# Patient Record
Sex: MALE | Race: WHITE | NOT HISPANIC OR LATINO | Employment: FULL TIME | URBAN - METROPOLITAN AREA
[De-identification: names, ages, dates, MRNs, and addresses within clinical notes are randomized per-mention and may not be internally consistent; named-entity substitution may affect disease eponyms.]

---

## 2024-10-25 ENCOUNTER — APPOINTMENT (OUTPATIENT)
Dept: RADIOLOGY | Facility: HOSPITAL | Age: 54
End: 2024-10-25
Payer: COMMERCIAL

## 2024-10-25 ENCOUNTER — HOSPITAL ENCOUNTER (EMERGENCY)
Facility: HOSPITAL | Age: 54
Discharge: HOME | End: 2024-10-25
Attending: STUDENT IN AN ORGANIZED HEALTH CARE EDUCATION/TRAINING PROGRAM
Payer: COMMERCIAL

## 2024-10-25 VITALS
BODY MASS INDEX: 31.15 KG/M2 | WEIGHT: 230 LBS | SYSTOLIC BLOOD PRESSURE: 128 MMHG | HEIGHT: 72 IN | RESPIRATION RATE: 16 BRPM | HEART RATE: 87 BPM | TEMPERATURE: 98.2 F | OXYGEN SATURATION: 98 % | DIASTOLIC BLOOD PRESSURE: 78 MMHG

## 2024-10-25 DIAGNOSIS — F10.920 ALCOHOLIC INTOXICATION WITHOUT COMPLICATION (CMS-HCC): ICD-10-CM

## 2024-10-25 DIAGNOSIS — S99.911A INJURY OF RIGHT ANKLE, INITIAL ENCOUNTER: ICD-10-CM

## 2024-10-25 DIAGNOSIS — S01.01XA LACERATION OF SCALP, INITIAL ENCOUNTER: ICD-10-CM

## 2024-10-25 DIAGNOSIS — S02.92XA: ICD-10-CM
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
## 2024-10-25 DIAGNOSIS — S09.90XA HEAD INJURY, INITIAL ENCOUNTER: Primary | ICD-10-CM

## 2024-10-25 LAB
ALBUMIN SERPL BCP-MCNC: 4.5 G/DL (ref 3.4–5)
ALP SERPL-CCNC: 78 U/L (ref 33–120)
ALT SERPL W P-5'-P-CCNC: 31 U/L (ref 10–52)
ANION GAP SERPL CALCULATED.3IONS-SCNC: 14 MMOL/L (ref 10–20)
AST SERPL W P-5'-P-CCNC: 50 U/L (ref 9–39)
BASOPHILS # BLD AUTO: 0.04 X10*3/UL (ref 0–0.1)
BASOPHILS NFR BLD AUTO: 0.3 %
BILIRUB SERPL-MCNC: 0.6 MG/DL (ref 0–1.2)
BUN SERPL-MCNC: 11 MG/DL (ref 6–23)
CALCIUM SERPL-MCNC: 9.1 MG/DL (ref 8.6–10.3)
CHLORIDE SERPL-SCNC: 106 MMOL/L (ref 98–107)
CO2 SERPL-SCNC: 25 MMOL/L (ref 21–32)
CREAT SERPL-MCNC: 0.87 MG/DL (ref 0.5–1.3)
EGFRCR SERPLBLD CKD-EPI 2021: >90 ML/MIN/1.73M*2
EOSINOPHIL # BLD AUTO: 0.08 X10*3/UL (ref 0–0.7)
EOSINOPHIL NFR BLD AUTO: 0.6 %
ERYTHROCYTE [DISTWIDTH] IN BLOOD BY AUTOMATED COUNT: 12.9 % (ref 11.5–14.5)
ETHANOL SERPL-MCNC: 141 MG/DL
GLUCOSE SERPL-MCNC: 111 MG/DL (ref 74–99)
HCT VFR BLD AUTO: 45.5 % (ref 41–52)
HGB BLD-MCNC: 15 G/DL (ref 13.5–17.5)
IMM GRANULOCYTES # BLD AUTO: 0.06 X10*3/UL (ref 0–0.7)
IMM GRANULOCYTES NFR BLD AUTO: 0.4 % (ref 0–0.9)
LYMPHOCYTES # BLD AUTO: 1.35 X10*3/UL (ref 1.2–4.8)
LYMPHOCYTES NFR BLD AUTO: 9.4 %
MCH RBC QN AUTO: 31.6 PG (ref 26–34)
MCHC RBC AUTO-ENTMCNC: 33 G/DL (ref 32–36)
MCV RBC AUTO: 96 FL (ref 80–100)
MONOCYTES # BLD AUTO: 0.86 X10*3/UL (ref 0.1–1)
MONOCYTES NFR BLD AUTO: 6 %
NEUTROPHILS # BLD AUTO: 12.01 X10*3/UL (ref 1.2–7.7)
NEUTROPHILS NFR BLD AUTO: 83.3 %
NRBC BLD-RTO: 0 /100 WBCS (ref 0–0)
PLATELET # BLD AUTO: 253 X10*3/UL (ref 150–450)
POTASSIUM SERPL-SCNC: 4.2 MMOL/L (ref 3.5–5.3)
PROT SERPL-MCNC: 7 G/DL (ref 6.4–8.2)
RBC # BLD AUTO: 4.75 X10*6/UL (ref 4.5–5.9)
SODIUM SERPL-SCNC: 141 MMOL/L (ref 136–145)
WBC # BLD AUTO: 14.4 X10*3/UL (ref 4.4–11.3)

## 2024-10-25 PROCEDURE — 99285 EMERGENCY DEPT VISIT HI MDM: CPT | Mod: 25

## 2024-10-25 PROCEDURE — 70450 CT HEAD/BRAIN W/O DYE: CPT

## 2024-10-25 PROCEDURE — 96374 THER/PROPH/DIAG INJ IV PUSH: CPT | Mod: 59

## 2024-10-25 PROCEDURE — 12001 RPR S/N/AX/GEN/TRNK 2.5CM/<: CPT | Performed by: STUDENT IN AN ORGANIZED HEALTH CARE EDUCATION/TRAINING PROGRAM

## 2024-10-25 PROCEDURE — 80053 COMPREHEN METABOLIC PANEL: CPT | Performed by: STUDENT IN AN ORGANIZED HEALTH CARE EDUCATION/TRAINING PROGRAM

## 2024-10-25 PROCEDURE — 71045 X-RAY EXAM CHEST 1 VIEW: CPT

## 2024-10-25 PROCEDURE — 72170 X-RAY EXAM OF PELVIS: CPT

## 2024-10-25 PROCEDURE — 72125 CT NECK SPINE W/O DYE: CPT

## 2024-10-25 PROCEDURE — 76377 3D RENDER W/INTRP POSTPROCES: CPT

## 2024-10-25 PROCEDURE — 2500000004 HC RX 250 GENERAL PHARMACY W/ HCPCS (ALT 636 FOR OP/ED): Performed by: STUDENT IN AN ORGANIZED HEALTH CARE EDUCATION/TRAINING PROGRAM

## 2024-10-25 PROCEDURE — 70486 CT MAXILLOFACIAL W/O DYE: CPT

## 2024-10-25 PROCEDURE — 85025 COMPLETE CBC W/AUTO DIFF WBC: CPT | Performed by: STUDENT IN AN ORGANIZED HEALTH CARE EDUCATION/TRAINING PROGRAM

## 2024-10-25 PROCEDURE — 36415 COLL VENOUS BLD VENIPUNCTURE: CPT | Performed by: STUDENT IN AN ORGANIZED HEALTH CARE EDUCATION/TRAINING PROGRAM

## 2024-10-25 PROCEDURE — 12002 RPR S/N/AX/GEN/TRNK2.6-7.5CM: CPT

## 2024-10-25 PROCEDURE — 73630 X-RAY EXAM OF FOOT: CPT | Mod: RIGHT SIDE

## 2024-10-25 PROCEDURE — 73630 X-RAY EXAM OF FOOT: CPT | Mod: RT

## 2024-10-25 PROCEDURE — 73610 X-RAY EXAM OF ANKLE: CPT | Mod: RIGHT SIDE

## 2024-10-25 PROCEDURE — 73610 X-RAY EXAM OF ANKLE: CPT | Mod: RT

## 2024-10-25 PROCEDURE — 82077 ASSAY SPEC XCP UR&BREATH IA: CPT | Performed by: STUDENT IN AN ORGANIZED HEALTH CARE EDUCATION/TRAINING PROGRAM

## 2024-10-25 RX ORDER — KETOROLAC TROMETHAMINE 10 MG/1
10 TABLET, FILM COATED ORAL EVERY 6 HOURS PRN
Qty: 20 TABLET | Refills: 0 | Status: SHIPPED | OUTPATIENT
Start: 2024-10-25 | End: 2024-10-30

## 2024-10-25 RX ORDER — LIDOCAINE HYDROCHLORIDE 10 MG/ML
5 INJECTION, SOLUTION INFILTRATION; PERINEURAL ONCE
Status: COMPLETED | OUTPATIENT
Start: 2024-10-25 | End: 2024-10-25

## 2024-10-25 RX ORDER — FENTANYL CITRATE 50 UG/ML
50 INJECTION, SOLUTION INTRAMUSCULAR; INTRAVENOUS ONCE
Status: COMPLETED | OUTPATIENT
Start: 2024-10-25 | End: 2024-10-25

## 2024-10-25 ASSESSMENT — PAIN SCALES - GENERAL
PAINLEVEL_OUTOF10: 10 - WORST POSSIBLE PAIN
PAINLEVEL_OUTOF10: 8

## 2024-10-25 ASSESSMENT — COLUMBIA-SUICIDE SEVERITY RATING SCALE - C-SSRS
1. IN THE PAST MONTH, HAVE YOU WISHED YOU WERE DEAD OR WISHED YOU COULD GO TO SLEEP AND NOT WAKE UP?: NO
2. HAVE YOU ACTUALLY HAD ANY THOUGHTS OF KILLING YOURSELF?: NO
6. HAVE YOU EVER DONE ANYTHING, STARTED TO DO ANYTHING, OR PREPARED TO DO ANYTHING TO END YOUR LIFE?: NO

## 2024-10-25 ASSESSMENT — PAIN - FUNCTIONAL ASSESSMENT: PAIN_FUNCTIONAL_ASSESSMENT: 0-10

## 2024-10-25 NOTE — DISCHARGE INSTRUCTIONS
Follow-up with facial surgery Dr. Sanders to schedule an appointment so that your injuries can be reevaluated within the next 5 days based on his recommendations.  Follow sinus precautions including: Cough with your mouth open, sneeze with your mouth open, do not blow your nose as these can increase pressure in the face around the fractures and cause further damage.  Return to the hospital for reevaluation if you have worsening eye pain, if you develop vision loss or blurred vision, fevers, or for any new or worsening symptoms such as abdominal pain, nausea and vomiting, blood in the urine or stool, chest pain, worsening shortness of breath all of which would require reassessment by a physician.    You should follow-up with Dr. Paez for an ankle surgeon for further evaluation of your ankle injury, use crutches and keep weight off of the affected foot and ankle, take Tylenol and Toradol as needed for pain control.  No fracture was found on x-ray imaging today, if you have worsening symptoms or persistent pain and inability to walk on the foot you may need repeat imaging.    Follow wound care instructions as provided. Keep the area clean and dry as much as possible. It is okay to submerge under running water, but do not submurge in standing water such as baths, pools, lakes or ponds etc. You can use antibiotic ointment to keep the wound moist and prevent infection.     Follow up in 7-10 days for suture removal. This can be done by your primary care physician, an urgent care, any emergency department.     Return to the ED for any new or concerning symptoms including reinjury, if your sutures come out too early and the wound reopens, if you develop green or white drainage, worsening pain, red rash around the site concerning for infection.

## 2024-10-27 NOTE — ED PROVIDER NOTES
HPI   Chief Complaint   Patient presents with    Fall     Patient bib family for fall. Patient is a heavy drinker, doesn't know how may steps he fell down. Co head, lip , flank, and leg pain. Vss, no loc        This is a 54-year-old male presenting the ED for evaluation of injuries after a fall down the steps which occurred last night.  He states that he drinks alcohol daily, he is not sure how much he drinks daily.  He states he was drunk last night and fell down the steps at home, he is not sure how many steps he fell down.  He is brought to the ED by family today for evaluation.  He complains mostly of pain at the right ankle, and has bruising and mild swelling to the area.  He also sustained lacerations to the top of the head and has bruising around the left eye.  He states that he feels like he is still drunk, he is not sure if he lost consciousness when he fell.  He denies any pain in the extremities, the back, the chest or abdomen.      History provided by:  Patient   used: No            Patient History   History reviewed. No pertinent past medical history.  History reviewed. No pertinent surgical history.  No family history on file.  Social History     Tobacco Use    Smoking status: Not on file    Smokeless tobacco: Not on file   Substance Use Topics    Alcohol use: Not on file    Drug use: Not on file       Physical Exam   ED Triage Vitals [10/25/24 0804]   Temperature Heart Rate Respirations BP   37 °C (98.6 °F) (!) 106 20 131/90      Pulse Ox Temp Source Heart Rate Source Patient Position   94 % Temporal Monitor Sitting      BP Location FiO2 (%)     Left arm --       Physical Exam  General: well developed, well nourished adult male who is awake and alert, in no apparent distress.  He is clinically intoxicated on arrival, bruising around the left eye and lacerations noted to the top of the head immediately.  Eyes: sclera clear bilaterally, PERRL, EOMI  HENT: normocephalic, lacerations  to the top of the head as well as mild skin tears adjacent to the area.  He has bruising circumferentially about the left orbit, no tenderness to palpation over the area, no facial instability. Pharynx without erythema or exudates, uvula midline.  No apparent dental injury, he does have a superficial abrasion over the right lower lip.  No intraoral laceration noted.  CV: regular rate and rhythm, no murmur, no gallops, or rubs. radial and dorsalis pedis pulses +2/4 bilaterally  Resp: clear to ascultation bilaterally, no wheezes, rales, or rhonchi  GI: abdomen soft, nontender without rigidity or guarding, no peritoneal signs, abdomen is nondistended, no masses palpated  MSK: No midline spinal tenderness, no tenderness to palpation over the ribs or chest wall, no tenderness to palpation over the shoulders, the upper extremities, the hips or pelvis, the lower extremities other than the right ankle.  There is mild to moderate swelling about the right ankle as well as some bruising posteriorly at the left heel.   Neuro: no focal deficits, CN2-12 intact. Sensation fully intact.  Psych: appropriate mood and affect, cooperative with exam  Skin: warm, dry, multiple abrasions to the top of the head as well as to approximately 2 cm lacerations.    ED Course & MDM   Diagnoses as of 10/27/24 1201   Head injury, initial encounter   Fall, initial encounter   Injury of right ankle, initial encounter   Multiple facial fractures, closed, initial encounter (Multi)   Laceration of scalp, initial encounter   Alcoholic intoxication without complication ([facility])                 No data recorded                                 Medical Decision Making  The patient was evaluated immediately upon arrival to the room in the ED.  Full head to toe exam was performed, he is mildly tachycardic on arrival, vitals otherwise normal other than mild hypertension.  He is awake alert, oriented x 4 although he does appear to be intoxicated still.  He has  to 2 cm lacerations to the top of the head as well as some abrasions and small skin tears adjacent to the area.  There is bruising around the left orbit.  No drainage from the eye or bleeding, no hyphema, patient reports normal visual acuity compared with his baseline and denies any eye pain.  He denies nausea and vomiting.  Visual fields are intact and extraocular movements are also intact.  Otherwise there is no tenderness to palpation over the cervical thoracic or lumbar spine in the midline, no tenderness to palpation over the shoulders, the hips or pelvis, the lower extremities other than the right ankle.  There is some bruising about the right heel bilaterally and mild swelling to the right ankle.  He has no abdominal tenderness, no tenderness over the ribs of the chest wall, no bruising over the chest or abdomen.    Patient was given 50 mcg of fentanyl for pain control.  Lidocaine was used for local anesthesia and 8 sutures were placed in total for closure of his lacerations.  There are some small skin tears to the top of the head that are not amenable to closure with sutures.    The patient was sent for CT imaging of his head and cervical spine and facial bones, x-rays of the chest and pelvis due to the mechanism of injury as well as x-rays of the foot and ankle.    X-rays of the chest and pelvis showed no acute fracture or bony deformity, no evidence of pneumothorax or other traumatic injury in the chest or pelvis.  X-ray of the right foot and ankle also were unremarkable showing no evidence of fracture or dislocation.  No deformity of the foot or ankle.  Given these findings I suspect soft tissue or ligamentous injury.  Patient was placed in an ankle immobilizer and given crutches, instructed on nonweightbearing status and referred to foot and ankle surgery for further assessment as an outpatient.  We discussed that CT could be obtained for further assessment given his ankle pain and bruising to further  evaluate for occult fracture or he can follow-up with the foot ankle surgeon after negative x-rays with nonweightbearing status.  He prefers to follow-up.    Patient's cervical spine shows no evidence of fracture or dislocation.  CT of the head and facial bones show no intracranial hemorrhage, however he does have multiple facial bone fractures including multiple orbital fractures, maxillary fractures, and an extraconal intraorbital hematoma.    These findings were discussed with the oral maxillofacial surgeon on-call for facial trauma, Dr. Sanders.  He does not feel that the patient needs to be transferred for further trauma evaluation or admitted to the hospital for emergent management of these fractures.  He states that patient should be placed on sinus precautions and he can follow-up in the office within the next 5 days for reassessment.  The patient does feel comfortable with this plan given that he has minimal pain at the site of the fractures, no facial instability on exam, and no change in his vision or eye pain.    On reassessment after imaging results and discussion with the oral maxillofacial surgeon, the patient is clinically sober.  He demonstrates understanding and decision making capacity on discussion of his results.  He does feel comfortable using crutches.  He was advised on the importance of follow-up with the facial surgeon as well as sinus precautions.  Return precautions were discussed including vision loss or double vision, eye pain, any new pain or signs of other injury that was not completely evaluated today which would require reassessment by physician immediately.  He was discharged in stable condition.    CT head wo IV contrast   Final Result   No evidence of acute cortical infarct or intracranial hemorrhage.        Facial fractures including fracture of the left maxilla with the   adjacent soft tissue swelling and soft tissue air. Please see   separate CT facial bone report for  details.        MACRO:   None.        Signed by: Shantelle Steele 10/25/2024 9:40 AM   Dictation workstation:   [id number]      CT cervical spine wo IV contrast   Final Result   No evidence for an acute fracture. Minimal degenerative   retrolisthesis C3-4. Multiple levels of degenerative stenosis, most   severe at C4-5.        MACRO:   None        Signed by: Shantelle Steele 10/25/2024 9:53 AM   Dictation workstation:   [id number]      CT maxillofacial bones wo IV contrast   Final Result   Lateral orbital, inferior orbital, anterior, medial and lateral   maxillary fractures and widening of the frontozygomatic suture noted.   However, there is no fracture noted in the zygomatic arch.        Lateral extraconal intraorbital hematoma. No intraconal hematoma is   noted and there is no obvious injury to the globe.        Left periorbital and left cheek soft tissue swelling and soft tissue   emphysema.        MACRO:   None        Signed by: Shantelle Steele 10/25/2024 9:49 AM   Dictation workstation:   [id number]      CT 3D reconstruction   Final Result   Lateral orbital, inferior orbital, anterior, medial and lateral   maxillary fractures and widening of the frontozygomatic suture noted.   However, there is no fracture noted in the zygomatic arch.        Lateral extraconal intraorbital hematoma. No intraconal hematoma is   noted and there is no obvious injury to the globe.        Left periorbital and left cheek soft tissue swelling and soft tissue   emphysema.        MACRO:   None        Signed by: Shantelle Steele 10/25/2024 9:49 AM   Dictation workstation:   [id number]      XR chest 1 view   Final Result   No acute radiographic abnormality        MACRO:   None.        Signed by: Shantelle Steele 10/25/2024 9:31 AM   Dictation workstation:   [id number]      XR pelvis 1-2 views   Final Result   No acute osseous abnormality             MACRO:   None        Signed by: hSantelle Steele 10/25/2024 9:38 AM   Dictation  workstation:   [id number]      XR foot right 3+ views   Final Result   No acute osseous abnormality             MACRO:   None        Signed by: Shantelle Steele 10/25/2024 9:33 AM   Dictation workstation:   [id number]      XR ankle right 3+ views   Final Result   No acute osseous abnormality             MACRO:   None        Signed by: Shantelle Steele 10/25/2024 9:33 AM   Dictation workstation:   [id number]        Labs Reviewed   CBC WITH AUTO DIFFERENTIAL - Abnormal       Result Value    WBC 14.4 (*)     nRBC 0.0      RBC 4.75      Hemoglobin 15.0      Hematocrit 45.5      MCV 96      MCH 31.6      MCHC 33.0      RDW 12.9      Platelets 253      Neutrophils % 83.3      Immature Granulocytes %, Automated 0.4      Lymphocytes % 9.4      Monocytes % 6.0      Eosinophils % 0.6      Basophils % 0.3      Neutrophils Absolute 12.01 (*)     Immature Granulocytes Absolute, Automated 0.06      Lymphocytes Absolute 1.35      Monocytes Absolute 0.86      Eosinophils Absolute 0.08      Basophils Absolute 0.04     COMPREHENSIVE METABOLIC PANEL - Abnormal    Glucose 111 (*)     Sodium 141      Potassium 4.2      Chloride 106      Bicarbonate 25      Anion Gap 14      Urea Nitrogen 11      Creatinine 0.87      eGFR >90      Calcium 9.1      Albumin 4.5      Alkaline Phosphatase 78      Total Protein 7.0      AST 50 (*)     Bilirubin, Total 0.6      ALT 31     ALCOHOL - Abnormal    Alcohol 141 (*)          Procedure  Laceration Repair    Performed by: Nikko Mckeon DO  Authorized by: Nikko Mckeon DO    Consent:     Consent obtained:  Verbal    Consent given by:  Patient    Risks, benefits, and alternatives were discussed: yes      Risks discussed:  Pain, poor cosmetic result, infection and poor wound healing    Alternatives discussed:  No treatment  Universal protocol:     Procedure explained and questions answered to patient or proxy's satisfaction: yes      Relevant documents present and verified: yes      Test  results available: yes      Imaging studies available: yes      Required blood products, implants, devices, and special equipment available: yes      Site/side marked: yes      Immediately prior to procedure, a time out was called: yes      Patient identity confirmed:  Verbally with patient  Anesthesia:     Anesthesia method:  Local infiltration    Local anesthetic:  Lidocaine 1% w/o epi  Laceration details:     Location:  Scalp    Scalp location:  Frontal    Length (cm):  2  Pre-procedure details:     Preparation:  Patient was prepped and draped in usual sterile fashion  Exploration:     Wound exploration: wound explored through full range of motion      Wound extent: no foreign bodies/material noted, no muscle damage noted and no underlying fracture noted      Contaminated: no    Treatment:     Area cleansed with:  Povidone-iodine    Amount of cleaning:  Standard    Irrigation solution:  Sterile saline    Debridement:  Minimal    Undermining:  None  Skin repair:     Repair method:  Sutures    Suture size:  4-0    Suture material:  Nylon    Suture technique:  Simple interrupted    Number of sutures:  4  Approximation:     Approximation:  Close  Repair type:     Repair type:  Complex  Post-procedure details:     Dressing:  Open (no dressing)    Procedure completion:  Tolerated well, no immediate complications  Laceration Repair    Performed by: Nikko Mckeon DO  Authorized by: Nikko Mckeon DO    Consent:     Consent obtained:  Verbal    Consent given by:  Patient    Risks, benefits, and alternatives were discussed: yes      Risks discussed:  Infection, pain, poor cosmetic result and poor wound healing    Alternatives discussed:  No treatment  Universal protocol:     Procedure explained and questions answered to patient or proxy's satisfaction: yes      Relevant documents present and verified: yes      Test results available: yes      Imaging studies available: yes      Required blood products,  implants, devices, and special equipment available: yes      Site/side marked: yes      Immediately prior to procedure, a time out was called: yes      Patient identity confirmed:  Verbally with patient  Anesthesia:     Anesthesia method:  None  Laceration details:     Location:  Scalp    Scalp location:  Crown    Length (cm):  2  Pre-procedure details:     Preparation:  Patient was prepped and draped in usual sterile fashion  Exploration:     Limited defect created (wound extended): no      Wound exploration: wound explored through full range of motion      Wound extent: no foreign bodies/material noted and no underlying fracture noted      Contaminated: no    Treatment:     Area cleansed with:  Povidone-iodine    Amount of cleaning:  Standard    Irrigation solution:  Sterile saline    Debridement:  None    Undermining:  None  Skin repair:     Repair method:  Sutures    Suture size:  4-0    Suture material:  Nylon    Suture technique:  Simple interrupted    Number of sutures:  4  Approximation:     Approximation:  Close  Repair type:     Repair type:  Simple  Post-procedure details:     Dressing:  Open (no dressing)    Procedure completion:  Tolerated well, no immediate complications       Nikko Mckeon DO  10/27/24 7310